# Patient Record
Sex: MALE | Race: WHITE
[De-identification: names, ages, dates, MRNs, and addresses within clinical notes are randomized per-mention and may not be internally consistent; named-entity substitution may affect disease eponyms.]

---

## 2020-09-17 ENCOUNTER — HOSPITAL ENCOUNTER (OUTPATIENT)
Dept: HOSPITAL 47 - RADUSWWP | Age: 70
Discharge: HOME | End: 2020-09-17
Attending: DERMATOLOGY
Payer: MEDICARE

## 2020-09-17 DIAGNOSIS — L30.9: Primary | ICD-10-CM

## 2020-09-17 NOTE — US
EXAMINATION TYPE: US venous doppler duplex LE RT

 

DATE OF EXAM: 9/17/2020 9:15 AM

 

COMPARISON: NONE

 

CLINICAL HISTORY: L30.9 Dermatitis unspecified. swelling in right lower leg, no h/o dvt

 

SIDE PERFORMED: Right  

 

TECHNIQUE:  The lower extremity deep venous system is examined utilizing real time linear array sonog
kelley with graded compression, doppler sonography and color-flow sonography.

 

VESSELS IMAGED:

External Iliac Vein (EIV)

Common Femoral Vein

Deep Femoral Vein

Greater Saphenous Vein *

Femoral Vein

Popliteal Vein

Small Saphenous Vein *

Proximal Calf Veins

(* superficial vessels)

 

 

 

Right Leg:  Negative for DVT

 

 

 

IMPRESSION: No evidence of DVT.

## 2022-06-05 ENCOUNTER — HOSPITAL ENCOUNTER (INPATIENT)
Dept: HOSPITAL 47 - EC | Age: 72
LOS: 2 days | Discharge: HOME | DRG: 281 | End: 2022-06-07
Attending: HOSPITALIST | Admitting: HOSPITALIST
Payer: MEDICARE

## 2022-06-05 DIAGNOSIS — Z80.52: ICD-10-CM

## 2022-06-05 DIAGNOSIS — I45.10: ICD-10-CM

## 2022-06-05 DIAGNOSIS — E78.5: ICD-10-CM

## 2022-06-05 DIAGNOSIS — Z87.820: ICD-10-CM

## 2022-06-05 DIAGNOSIS — I42.8: ICD-10-CM

## 2022-06-05 DIAGNOSIS — I21.4: Primary | ICD-10-CM

## 2022-06-05 DIAGNOSIS — Z79.899: ICD-10-CM

## 2022-06-05 DIAGNOSIS — Z88.0: ICD-10-CM

## 2022-06-05 DIAGNOSIS — Z82.49: ICD-10-CM

## 2022-06-05 DIAGNOSIS — Z91.012: ICD-10-CM

## 2022-06-05 DIAGNOSIS — E66.9: ICD-10-CM

## 2022-06-05 DIAGNOSIS — R51.9: ICD-10-CM

## 2022-06-05 DIAGNOSIS — I25.110: ICD-10-CM

## 2022-06-05 DIAGNOSIS — I10: ICD-10-CM

## 2022-06-05 DIAGNOSIS — F41.9: ICD-10-CM

## 2022-06-05 DIAGNOSIS — E11.9: ICD-10-CM

## 2022-06-05 LAB
ALBUMIN SERPL-MCNC: 4 G/DL (ref 3.5–5)
ALP SERPL-CCNC: 35 U/L (ref 38–126)
ALT SERPL-CCNC: 19 U/L (ref 4–49)
ANION GAP SERPL CALC-SCNC: 9 MMOL/L
APTT BLD: 24.2 SEC (ref 22–30)
AST SERPL-CCNC: 25 U/L (ref 17–59)
BASOPHILS # BLD AUTO: 0.1 K/UL (ref 0–0.2)
BASOPHILS NFR BLD AUTO: 1 %
BUN SERPL-SCNC: 11 MG/DL (ref 9–20)
CALCIUM SPEC-MCNC: 8.9 MG/DL (ref 8.4–10.2)
CHLORIDE SERPL-SCNC: 106 MMOL/L (ref 98–107)
CO2 SERPL-SCNC: 25 MMOL/L (ref 22–30)
EOSINOPHIL # BLD AUTO: 0.6 K/UL (ref 0–0.7)
EOSINOPHIL NFR BLD AUTO: 7 %
ERYTHROCYTE [DISTWIDTH] IN BLOOD BY AUTOMATED COUNT: 4.72 M/UL (ref 4.3–5.9)
ERYTHROCYTE [DISTWIDTH] IN BLOOD: 14.3 % (ref 11.5–15.5)
GLUCOSE SERPL-MCNC: 202 MG/DL (ref 74–99)
HCT VFR BLD AUTO: 43.2 % (ref 39–53)
HGB BLD-MCNC: 14.6 GM/DL (ref 13–17.5)
INR PPP: 0.9 (ref ?–1.2)
LIPASE SERPL-CCNC: 112 U/L (ref 23–300)
LYMPHOCYTES # SPEC AUTO: 3.8 K/UL (ref 1–4.8)
LYMPHOCYTES NFR SPEC AUTO: 41 %
MAGNESIUM SPEC-SCNC: 2 MG/DL (ref 1.6–2.3)
MCH RBC QN AUTO: 30.9 PG (ref 25–35)
MCHC RBC AUTO-ENTMCNC: 33.8 G/DL (ref 31–37)
MCV RBC AUTO: 91.5 FL (ref 80–100)
MONOCYTES # BLD AUTO: 0.5 K/UL (ref 0–1)
MONOCYTES NFR BLD AUTO: 6 %
NEUTROPHILS # BLD AUTO: 3.9 K/UL (ref 1.3–7.7)
NEUTROPHILS NFR BLD AUTO: 43 %
PLATELET # BLD AUTO: 190 K/UL (ref 150–450)
POTASSIUM SERPL-SCNC: 4.1 MMOL/L (ref 3.5–5.1)
PROT SERPL-MCNC: 6.5 G/DL (ref 6.3–8.2)
PT BLD: 10.2 SEC (ref 9–12)
SODIUM SERPL-SCNC: 140 MMOL/L (ref 137–145)
WBC # BLD AUTO: 9.1 K/UL (ref 3.8–10.6)

## 2022-06-05 PROCEDURE — 99291 CRITICAL CARE FIRST HOUR: CPT

## 2022-06-05 PROCEDURE — 80053 COMPREHEN METABOLIC PANEL: CPT

## 2022-06-05 PROCEDURE — 93005 ELECTROCARDIOGRAM TRACING: CPT

## 2022-06-05 PROCEDURE — 85610 PROTHROMBIN TIME: CPT

## 2022-06-05 PROCEDURE — 80061 LIPID PANEL: CPT

## 2022-06-05 PROCEDURE — 85730 THROMBOPLASTIN TIME PARTIAL: CPT

## 2022-06-05 PROCEDURE — 83690 ASSAY OF LIPASE: CPT

## 2022-06-05 PROCEDURE — 80048 BASIC METABOLIC PNL TOTAL CA: CPT

## 2022-06-05 PROCEDURE — 36415 COLL VENOUS BLD VENIPUNCTURE: CPT

## 2022-06-05 PROCEDURE — 83735 ASSAY OF MAGNESIUM: CPT

## 2022-06-05 PROCEDURE — 93308 TTE F-UP OR LMTD: CPT

## 2022-06-05 PROCEDURE — 71046 X-RAY EXAM CHEST 2 VIEWS: CPT

## 2022-06-05 PROCEDURE — 83880 ASSAY OF NATRIURETIC PEPTIDE: CPT

## 2022-06-05 PROCEDURE — 96374 THER/PROPH/DIAG INJ IV PUSH: CPT

## 2022-06-05 PROCEDURE — 84484 ASSAY OF TROPONIN QUANT: CPT

## 2022-06-05 PROCEDURE — 85025 COMPLETE CBC W/AUTO DIFF WBC: CPT

## 2022-06-05 PROCEDURE — 93458 L HRT ARTERY/VENTRICLE ANGIO: CPT

## 2022-06-05 NOTE — ED
Chest Pain HPI





- General


Chief Complaint: Chest Pain


Stated Complaint: Chest Pain


Time Seen by Provider: 06/05/22 21:48


Source: patient, family, RN notes reviewed


Mode of arrival: ambulatory


Limitations: no limitations





- History of Present Illness


Initial Comments: 





71-year-old male with a history of hypertension a closed head injury who is 

asked she scheduled for a cardiac catheterization in 4 days who presents tonight

with complaints of chest heaviness which started about one hour ago associated 

with shortness of breath.  It was 10/10 severity he took nitroglycerin at home 

with improvement to 4/10 no fevers chills sweats no other current complaints or 

modifying factors


MD Complaint: chest pain





- Related Data


                                    Allergies











Allergy/AdvReac Type Severity Reaction Status Date / Time


 


egg Allergy  Nausea & Verified 06/05/22 21:47





   Vomiting &  





   Diarrhea  


 


Penicillins Allergy  Rash/Hives Verified 06/05/22 21:47














Review of Systems


ROS Statement: 


Those systems with pertinent positive or pertinent negative responses have been 

documented in the HPI.





ROS Other: All systems not noted in ROS Statement are negative.





EKG Findings





- EKG Results:


EKG: interpreted by CLARISSE, sinus rhythm (Sinus rhythm a 55968 QRS duration 150 QT

since /452 that exodeviation right bundle-branch block pattern moderate 

T-wave abnormality)





Past Medical History


Past Medical History: Hypertension


Additional Past Medical History / Comment(s): head injury


History of Any Multi-Drug Resistant Organisms: None Reported


Past Surgical History: Heart Catheterization


Past Psychological History: Anxiety


Smoking Status: Never smoker


Past Alcohol Use History: None Reported


Past Drug Use History: None Reported





General Exam





- General Exam Comments


Initial Comments: 





This is a well-developed well-nourished awake alert oriented times 4 male


Limitations: no limitations


General appearance: alert, anxious


Head exam: Present: atraumatic, normocephalic, normal inspection


Eye exam: Present: normal appearance, PERRL, EOMI.  Absent: scleral icterus, 

conjunctival injection, periorbital swelling


ENT exam: Present: normal exam, mucous membranes moist


Neck exam: Present: normal inspection, full ROM, other.  Absent: tenderness, 

meningismus, lymphadenopathy


Respiratory exam: Present: normal lung sounds bilaterally.  Absent: respiratory 

distress, wheezes, rales, rhonchi, stridor


Cardiovascular Exam: Present: regular rate, normal rhythm, normal heart sounds. 

Absent: systolic murmur, diastolic murmur, rubs, gallop, clicks


GI/Abdominal exam: Present: soft, normal bowel sounds.  Absent: distended, 

tenderness, guarding, rebound, rigid


Extremities exam: Present: normal inspection, full ROM, normal capillary refill.

 Absent: tenderness, pedal edema, joint swelling, calf tenderness


Back exam: Present: normal inspection


Neurological exam: Present: alert, oriented X3, CN II-XII intact


Psychiatric exam: Present: normal affect, normal mood


Skin exam: Present: warm, dry, intact, normal color.  Absent: rash





Course


                                   Vital Signs











  06/05/22 06/05/22 06/05/22





  21:40 22:00 22:29


 


Temperature 97.5 F L  


 


Pulse Rate 62  87


 


Respiratory 18 20 20





Rate   


 


Blood Pressure 144/70  153/91


 


O2 Sat by Pulse 96  97





Oximetry   














  06/05/22





  22:46


 


Temperature 


 


Pulse Rate 65


 


Respiratory 20





Rate 


 


Blood Pressure 142/80


 


O2 Sat by Pulse 99





Oximetry 














- Reevaluation(s)


Reevaluation #1: 





06/05/22 23:23


Reevaluation patient finds that he is pain-free after treatment rendered.





Chest Pain MDM





- MDM





Imaging reviewed no acute findings.  I did discuss findings with patient family 

members as well as with Dr. Medrano patient be admitted for inpatient evaluation

and treatment consultation by Dr. Mercedes.





Critical Care Time


Critical Care Time: Yes


Total Critical Care Time: 31


Critical Care Time: 





Critical care time includes initial presentation with history physical labs x-

rays reevaluation patient responsive therapy review of old charting was 

available.  Discussed with patient family regarding findings discussion with the

admitting physician admission orders and documentation of the above





Disposition


Clinical Impression: 


 Non-STEMI (non-ST elevated myocardial infarction), Chest pain





Disposition: ADMITTED AS IP TO THIS [facility]


Condition: Stable


Referrals: 


Gold Bolden MD [Primary Care Provider] - 1-2 days


Decision Date: 06/05/22


Decision Time: 23:24

## 2022-06-05 NOTE — XR
EXAMINATION TYPE: XR chest 2V

 

DATE OF EXAM: 6/5/2022

 

COMPARISON: NONE

 

HISTORY: Chest pain

 

TECHNIQUE: 2 views

 

FINDINGS: Heart is normal. Lungs are clear of infiltrate. Diaphragm is normal. Bony thorax is intact.
 There are chest leads

 

IMPRESSION: Normal chest.

## 2022-06-06 LAB
CHOLEST SERPL-MCNC: 138 MG/DL (ref 0–200)
HDLC SERPL-MCNC: 21.1 MG/DL (ref 40–60)
LDLC SERPL CALC-MCNC: 68.3 MG/DL (ref 0–131)
TRIGL SERPL-MCNC: 243 MG/DL (ref 0–149)
VLDLC SERPL CALC-MCNC: 48.6 MG/DL (ref 5–40)

## 2022-06-06 PROCEDURE — B2111ZZ FLUOROSCOPY OF MULTIPLE CORONARY ARTERIES USING LOW OSMOLAR CONTRAST: ICD-10-PCS

## 2022-06-06 PROCEDURE — 4A023N7 MEASUREMENT OF CARDIAC SAMPLING AND PRESSURE, LEFT HEART, PERCUTANEOUS APPROACH: ICD-10-PCS

## 2022-06-06 RX ADMIN — METOPROLOL SUCCINATE SCH MG: 50 TABLET, EXTENDED RELEASE ORAL at 09:25

## 2022-06-06 RX ADMIN — ACETAMINOPHEN PRN MG: 325 TABLET, FILM COATED ORAL at 03:20

## 2022-06-06 RX ADMIN — ACETAMINOPHEN PRN MG: 325 TABLET, FILM COATED ORAL at 15:41

## 2022-06-06 RX ADMIN — ISOSORBIDE MONONITRATE SCH MG: 30 TABLET, EXTENDED RELEASE ORAL at 12:27

## 2022-06-06 RX ADMIN — SPIRONOLACTONE SCH MG: 25 TABLET, FILM COATED ORAL at 12:27

## 2022-06-06 RX ADMIN — SACUBITRIL AND VALSARTAN SCH EACH: 24; 26 TABLET, FILM COATED ORAL at 20:12

## 2022-06-06 RX ADMIN — CEFAZOLIN SCH MLS/HR: 330 INJECTION, POWDER, FOR SOLUTION INTRAMUSCULAR; INTRAVENOUS at 12:26

## 2022-06-06 NOTE — CA
Transthoracic Echo Report 

 Name: Abhay Romero 

 MRN:    [id number] 

 Age:    71     Gender:     M 

 

 :    1950 

 Exam Date:     2022 09:16 

 Exam Location: [facility] 

 Ht (in):     64     Wt (lb):     200 

 Ordering Physician:        Yani Carrillo 

 Attending/Referring Phys: 

 Technician         Christa Riojas RDCS 

 Procedure CPT: 

 Indications:       elevated troponin, chest pain 

 

 Cardiac Hx: 

 Technical Quality:      Good 

 Contrast 1:                                Total Dose (mL): 

 Contrast 2:                                Total Dose (mL): 

 

 MEASUREMENTS  (Male / Female) Normal Values 

 2D ECHO 

 LV Diastolic Diameter PLAX        3.1 cm                4.2 - 5.9 / 3.9 - 5.3 cm 

 LV Systolic Diameter PLAX         1.6 cm                 

 IVS Diastolic Thickness           2.4 cm                0.6 - 1.0 / 0.6 - 0.9 cm 

 LVPW Diastolic Thickness          2.1 cm                0.6 - 1.0 / 0.6 - 0.9 cm 

 LV Relative Wall Thickness        1.5                    

 

 

 FINDINGS 

 Left Ventricle 

 Severely increased left ventricular wall thickness. Decreased cavity due to  

 thickness. Left ventricular ejection fraction is estimated at 40-45%. 

 

 Right Ventricle 

 

 

 Right Atrium 

 

 

 Left Atrium 

 

 

 Mitral Valve 

 

 

 Aortic Valve 

 

 

 Tricuspid Valve 

 

 

 Pulmonic Valve 

 

 

 Pericardium 

 No pericardial effusion. 

 

 Aorta 

 

 

 CONCLUSIONS 

 #1.  Limited study #2.  Severe concentric left ankle hypertrophy #3.  Small  

 left ankle cavity before.  #4.  Left ankle function overall appears to be  

 diminished with an ejection fraction about 40-45% 

 Previewed by:  

 Dr. Jenelle Mercedes MD 

 (Electronically Signed) 

 Final Date:      2022 11:44

## 2022-06-06 NOTE — P.CARDCATH
Date of Procedure: 06/06/22


Preoperative Diagnosis: 


Unstable angina and shortness of breath


Postoperative Diagnosis: 


Moderate plaque in the diagonal at the ostium.  The rest of the presence of free

of any significant focal lesions


Procedure(s) Performed: 


Left heart catheterization without left ventriculography


Description of Procedure: 


HISTORY: This is a 71-year-old gentleman with history of hypertension, 

hypertrophic cardiomyopathy who has been experiencing exertional shortness of 

breath.  Stress test was suggestive of possible ischemia of the inferior wall 

with generalized hypokinesia and an ejection fraction of 30%.  Patient was 

initiated on nitrates and advised to have cardiac catheterization.  





CONSENT:I have discussed the risks, benefits and alternative therapies for the 

above-mentioned procedure and for both sedation/analgesia as well as necessary 

blood product administration, if indicated, as they pertain to this patient.  

The patient has indicated understanding and acceptance of the risks and 

procedures discussed.











PROCEDURE: Patient was brought to the lab in a fasting state.  Patient was given

some IV sedation.  The right wrist is infiltrated with lidocaine and right 

radial artery was entered using Seldinger technique.  A 6-Nigerian catheter was 

left in place and selective coronary arteriography  was performed.  Patient 

tolerated the procedure well.  TR band was applied  was applied for hemostasis. 

No immediate complications were noted and patient was transferred to [facility] in a 

stable condition





Conscious Sedation: Versed 1mg


Fentanyl 25 micrograms.  Patient also received 4000 units of heparin


Duration 19minutes   


HEMODYNAMICS: .  The aortic pressure is about 130/70.  The left ventricle end-

diastolic pressure is about 25-30.  There was no gradient across the aortic 

valve





SELECTIVE CORONARY ARTERIOGRAPHY: []


                          LEFT MAIN: Normal length and free of occlusive disease


                          THE LEFT ANTERIOR DESCENDING CORONARY ARTERY: .  Good 

caliber vessel which wraps around the apex.  Gives rise to good-sized second 

diagonal branch which has about 60% ostial lesion


                          THE LEFT CIRCUMFLEX AND IS CORONARY ARTERY: .  This is

 a moderate caliber vessel.  There is also good-sized intermediate/OM branch.  

Circumflex and branches are free of occlusive disease


                          THE RIGHT CORONARY ARTERY: .  This is a large and 

dominant vessel.  Free of occlusive disease





      





LEFT VENTRICULOGRAPHY: .  Performed





FINAL IMPRESSION: Moderate plaque in the diagonal.  Otherwise, no significant 

disease.  Elevated end-diastolic pressure with impaired LV function on the 

echocardiogram





PLAN: Maximum medical therapy and this factor modification.  We'll also start 

the patient on Entresto , Diuretics including Lasix and Aldactone





PROGNOSIS: Guarded

## 2022-06-06 NOTE — P.CRDCN
History of Present Illness


History of present illness: 


HISTORY OF PRESENTING ILLNESS


This is a pleasant 71 year-old male past medical history significant for 

hypertension, type 2 diabetes, and closed head injury. He follows in the office 

with Dr. Mercedes. We have been asked to see in consultation for chest pain. 

Patient presents to the ER with complaints of worsening chest heaviness and 

shortness of breath. His chest discomfort and shortness of breath is aggravated 

by exertion and activity. Non-radiating. No specific alleviating factors, Nitro 

did not help. It has progressively gotten worse over the past 2 weeks. No 

associated diaphoresis, nausea, vomiting, lightheadedness, dizziness or syncope.

He denies symptoms of orthopnea or PND. He does not have a history of MI, CAD, 

or Stroke. He denies tobacco use, quit 40+ years ago. Patient recently was 

evaluated in the office, he underwent Lexiscan stress test which was abnormal. 

Plan for cardiac catheterization on 6/9/2022 was scheduled. 





DIAGNOSTICS


EKG reveals sinus rhythm, heart rate 60, right bundle-branch block, left axis 

deviation, T-wave inversions in anterior and lateral leads.  Prior EKG in the 

office 03/2022 was similar T wave abnormalities.


Recent Lexiscan in the office 04/14/2022= revealed abnormal perfusion study with

reversible ischemia involving the inferolateral segment. Generalized hypokinesia

more pronounced on the septal area with an EF of about 30% 


Recent Echo in the office 05/20/22- EF 70%, LV hyperdynamic, mild AR, Mild MR, 

moderate TR.  


Last Cardiac Catheterization 2016 revealed no significant coronary artery 

disease. Questionable plaque in the bifurtcation of the LAD and diagonal branch 


Telemetry tracings indicate sinus mechanism


Chest xray no acute cardiopulmonary process


Laboratory reviewed, troponin 0.062, CBC unremarkable, sodium 140, potassium 

4.1, BUN 11, serum creatinine 0.9, magnesium 2.0, proBNP is 832


Current home medications include rosuvastatin 5 mg daily, metoprolol succinate 

75 mg daily, Imdur 30 mg daily, low fibra 160 mg daily





REVIEW OF SYSTEMS


At the time of my exam:


CONSTITUTIONAL: Denies fever or chills.


CARDIOVASCULAR: Denies chest pain, shortness of breath, orthopnea, PND or 

palpitations.


RESPIRATORY: Denies cough. 


GASTROINTESTINAL: Denies abdominal pain, diarrhea, constipation, nausea or 

vomiting.


MUSCULOSKELETAL: Denies myalgias.


NEUROLOGIC: Denies numbness, tingling, headache or weakness.


ENDOCRINE: Denies fatigue, weight change,  polydipsia or polyurina.


GENITOURINARY: Denies burning, hematuria or urgency with micturation.


HEMATOLOGIC: Denies history of anemia or bleeding. 





PHYSICAL EXAMINATION


Blood pressure 147/64, heart rate 60, afebrile, saturation 99% on 3 L nasal 

cannula


CONSTITUTIONAL: No apparent distress. 


HEENT: Head is normocephalic. Pupils are equal, round. Sclerae anicteric. Mucous

membranes of the mouth are moist.  No JVD. No carotid bruit.


CHEST EXAMINATION: Lungs are clear to auscultation. No chest wall tenderness is 

noted on palpation or with deep breathing. 


HEART EXAMINATION: Regular rate and rhythm. S1, S2 heard. No murmurs, gallops or

rub.


ABDOMEN: Soft, nontender. Positive bowel sounds.


EXTREMITIES: 2+ peripheral pulses, no lower extremity edema and no calf ten

derness.


SKIN: warm, dry 


NEUROLOGIC EXAMINATION: Patient is awake, alert and oriented x3. 





ASSESSMENT


NSTEMI


Hypertension


Type 2 diabetes


History of closed head injury





PLAN


We will plan for cardiac catheterization today with Dr. Mercedes 


I have discussed the risks, benefits and alternative therapies for the above-

mentioned procedure and for both sedation/analgesia as well as necessary blood 

product administration, if indicated, as they pertain to this patient.  The 

patient has indicated understanding and acceptance of the risks and procedures 

discussed.  Questions have been answered appropriately and he is agreeable to 

move forward with the above-stated procedure. 


Obtain 2D echocardiogram and doppler study to assess cardiac structure and 

function. 


Continue home aspirin, statin, metoprolol. 


Further recommendations based on clinical course





Thank you kindly for this consultation. 


Nurse practitioner note has been reviewed by physician. Signing provider agrees 

with the documented findings, assessment, and plan of care. 











Past Medical History


Past Medical History: Diabetes Mellitus, Hypertension


Additional Past Medical History / Comment(s): head injury. DM controlled with 

diet.


History of Any Multi-Drug Resistant Organisms: None Reported


Past Surgical History: Heart Catheterization


Past Psychological History: Anxiety


Smoking Status: Never smoker


Past Alcohol Use History: None Reported


Past Drug Use History: None Reported





- Past Family History


  ** Father


Family Medical History: Cancer, Myocardial Infarction (MI)


Additional Family Medical History / Comment(s): bladder CA





Medications and Allergies


                                Home Medications











 Medication  Instructions  Recorded  Confirmed  Type


 


Fenofibrate [Lofibra] 160 mg PO DAILY 06/06/22 06/06/22 History


 


Gabapentin 300 mg PO TID 06/06/22 06/06/22 History


 


Isosorbide Mononitrate ER [Imdur] 30 mg PO DAILY 06/06/22 06/06/22 History


 


LORazepam [Ativan] 0.5 - 1 mg PO DAILY PRN 06/06/22 06/06/22 History


 


Metoprolol Succinate (ER) [Toprol 50 mg PO DAILY 06/06/22 06/06/22 History





Xl]    


 


Metoprolol Succinate [Toprol XL] 25 mg PO DAILY 06/06/22 06/06/22 History


 


Nitroglycerin Sl Tabs [Nitrostat] 0.4 mg SUBLINGUAL Q5M PRN 06/06/22 06/06/22 

History


 


Rosuvastatin Calcium [Crestor] 5 mg PO HS 06/06/22 06/06/22 History








                                    Allergies











Allergy/AdvReac Type Severity Reaction Status Date / Time


 


egg Allergy  Nausea & Verified 06/06/22 08:44





   Vomiting &  





   Diarrhea  


 


Penicillins Allergy  Rash/Hives Verified 06/06/22 08:44


 


buspirone [From BuSpar] AdvReac  HEADACHE Verified 06/06/22 08:44














Physical Exam


Vitals: 


                                   Vital Signs











  Temp Pulse Pulse Resp BP BP Pulse Ox


 


 06/06/22 03:47  97.8 F   56 L  17   147/64  99


 


 06/06/22 00:25  97.8 F   60  18   148/71  98


 


 06/05/22 22:46   65   20  142/80   99


 


 06/05/22 22:29   87   20  153/91   97


 


 06/05/22 22:00     20   


 


 06/05/22 21:40  97.5 F L  62   18  144/70   96








                                Intake and Output











 06/05/22 06/06/22 06/06/22





 22:59 06:59 14:59


 


Other:   


 


  Voiding Method  Toilet 


 


  Weight 90.718 kg 90.718 kg 














Results





                                 06/05/22 22:04





                                 06/05/22 22:04


                                 Cardiac Enzymes











  06/05/22 06/05/22 06/06/22 Range/Units





  22:04 22:04 01:02 


 


AST  25    (17-59)  U/L


 


Troponin I   0.063 H*  0.061 H*  (0.000-0.034)  ng/mL








                                   Coagulation











  06/05/22 Range/Units





  22:04 


 


PT  10.2  (9.0-12.0)  sec


 


APTT  24.2  (22.0-30.0)  sec








                                       CBC











  06/05/22 Range/Units





  22:04 


 


WBC  9.1  (3.8-10.6)  k/uL


 


RBC  4.72  (4.30-5.90)  m/uL


 


Hgb  14.6  (13.0-17.5)  gm/dL


 


Hct  43.2  (39.0-53.0)  %


 


Plt Count  190  (150-450)  k/uL








                          Comprehensive Metabolic Panel











  06/05/22 Range/Units





  22:04 


 


Sodium  140  (137-145)  mmol/L


 


Potassium  4.1  (3.5-5.1)  mmol/L


 


Chloride  106  ()  mmol/L


 


Carbon Dioxide  25  (22-30)  mmol/L


 


BUN  11  (9-20)  mg/dL


 


Creatinine  0.96  (0.66-1.25)  mg/dL


 


Glucose  202 H  (74-99)  mg/dL


 


Calcium  8.9  (8.4-10.2)  mg/dL


 


AST  25  (17-59)  U/L


 


ALT  19  (4-49)  U/L


 


Alkaline Phosphatase  35 L  ()  U/L


 


Total Protein  6.5  (6.3-8.2)  g/dL


 


Albumin  4.0  (3.5-5.0)  g/dL








                               Current Medications











Generic Name Dose Route Start Last Admin





  Trade Name Freq  PRN Reason Stop Dose Admin


 


Acetaminophen  650 mg  06/06/22 03:09  06/06/22 03:20





  Acetaminophen Tab 325 Mg Tab  PO   650 mg





  Q6HR PRN   Administration





  Fever and/ or Pain  


 


Aspirin  325 mg  06/06/22 09:00 





  Aspirin 325 Mg Tab  PO  





  DAILY JANETH  


 


Heparin Sodium/Sodium Chloride  250 mls @ 10 mls/hr  06/05/22 22:00  06/05/22 

22:24





  25,000 unit/ Sodium Chloride  IV   11.0231 units/kg/hr





  .Q24H JANETH   10 mls/hr





    Administration





  Protocol  





  11.0231 UNITS/KG/HR  


 


Nitroglycerin  0.4 mg  06/05/22 23:25 





  Nitroglycerin Sl Tabs 0.4 Mg Tab  SUBLINGUAL  





  Q5M PRN  





  Chest Pain  








                                Intake and Output











 06/05/22 06/06/22 06/06/22





 22:59 06:59 14:59


 


Other:   


 


  Voiding Method  Toilet 


 


  Weight 90.718 kg 90.718 kg 








                                        





                                 06/05/22 22:04 





                                 06/05/22 22:04

## 2022-06-06 NOTE — P.HPIM
History of Present Illness


H&P Date: 06/06/22


Chief Complaint: Shortness of breath





This is a pleasant 71-year-old patient who follows with Gold Bolden.  

Cardiologist Dr. Ruiz.  Chronic stable medical conditions include 

diabetes, hypertension, hyperlipidemia, anxiety.  Patient been getting short of 

breath on and off for some time.  Especially with activity.  Some chest 

discomfort..  No edema.  No fever no chills.  He had a scheduled cardiac 

catheterization on June 9 following abnormal Lexiscan nuclear stress test.  No 

dizziness or lightheadedness.  No perspiration.


As symptoms have progressively gotten worse especially yesterday patient decided

to come in.  Had some mild posterior troponin.  Early this morning patient 

underwent a cardiac catheterization.  Was found of a plaque dose at 11 disease 

otherwise.  Medical management was decided.





Review of systems:


GEN.:  Tired


EYES: None


HEENT: None


NECK: None


RESPIRATORY: As above


CARDIOVASCULAR: As above


GASTROINTESTINAL: None


GENITOURINARY: None


MUSCULOSKELETAL: None


LYMPHATICS: None


HEMATOLOGICAL: None  


PSYCHIATRY: None


NEUROLOGICAL: None





Past medical history to include:


Diabetes, hypertension, hyperlipidemia, anxiety





Social history:


No smoking or alcohol.  .  Used to be a [employer]





Family history:


Bladder cancer.  MI.





Physical examination:


VITAL SIGNS: 97.5, 62, 18, 140/70, and 6% room air upon presentation


GENERAL: BMI 34.3, reclining in bed awake comfortable.


EYES: Pupils equal.  Conjunctiva normal.


HEENT: External appearance of nose and ears normal, oral cavity grossly normal.


NECK: JVD not raised; masses not palpable.


HEART: First and second heart sounds are normal;  no edema.  


LUNGS: Respiratory rate normal; clear to auscultation.  


ABDOMEN: Soft,  nontender, liver spleen not palpable, no masses palpable.  


PSYCH: Alert and oriented x3;  mood  and affect normal.  


MUSCULOSKELETAL:No Clubbing/cyanosis;muscles-grossly intact


NEUROLOGICAL: Cranial nerves grossly intact; no facial asymmetry,   power and 

sensation grossly intact. 


LYMPHATICS: No lymph nodes palpable in the axilla and neck





INVESTIGATIONS, reviewed in the clinical context:


White count 9.1 hemoglobin 14.6 platelets 190 potassium 4.1 creatinine 0.96


Troponin I 0.063, 0.061, 0.057


LDL 68 triglycerides 243


EKG tracing personally reviewed by me-sinus rhythm.  T waves in inferolateral 

leads


Chest x-ray film personally reviewed by me-cardiomegaly


Limited 2-D echocardiogram: Severely increased left ventricular wall thickness. 

EF 40-45%.


Cardiac catheterization: Moderate plaque in the diagonal at the ostium.  

Otherwise no other significant disease.





Assessment plan:





-Non-ST elevation myocardial infarction


Cardiac cath showing moderateplaque





-Hyperlipidemia


Crestor 5 mg daily at bedtime





-Essential hypertension


Toprol-XL 50 mg a day





-Cardiomyopathy.  EF 40-45%


Entresto 24/26 one tablet twice a day started today.  Aldactone 25 mg daily.  

Toprol-XL.  Lasix 40 mg a day.





Patient status post cardiac cath.  Medications above.  Discussed with the 

patient.  Repeat labs in the morning.





Past Medical History


Past Medical History: Diabetes Mellitus, Hypertension


Additional Past Medical History / Comment(s): head injury. DM controlled with 

diet.


History of Any Multi-Drug Resistant Organisms: None Reported


Past Surgical History: Heart Catheterization


Past Psychological History: Anxiety


Smoking Status: Never smoker


Past Alcohol Use History: None Reported


Past Drug Use History: None Reported





- Past Family History


  ** Father


Family Medical History: Cancer, Myocardial Infarction (MI)


Additional Family Medical History / Comment(s): bladder CA





Medications and Allergies


                                Home Medications











 Medication  Instructions  Recorded  Confirmed  Type


 


Fenofibrate [Lofibra] 160 mg PO DAILY 06/06/22 06/06/22 History


 


Gabapentin 300 mg PO TID 06/06/22 06/06/22 History


 


Isosorbide Mononitrate ER [Imdur] 30 mg PO DAILY 06/06/22 06/06/22 History


 


LORazepam [Ativan] 0.5 - 1 mg PO DAILY PRN 06/06/22 06/06/22 History


 


Metoprolol Succinate (ER) [Toprol 50 mg PO DAILY 06/06/22 06/06/22 History





Xl]    


 


Metoprolol Succinate [Toprol XL] 25 mg PO DAILY 06/06/22 06/06/22 History


 


Nitroglycerin Sl Tabs [Nitrostat] 0.4 mg SUBLINGUAL Q5M PRN 06/06/22 06/06/22 

History


 


Rosuvastatin Calcium [Crestor] 5 mg PO HS 06/06/22 06/06/22 History








                                    Allergies











Allergy/AdvReac Type Severity Reaction Status Date / Time


 


egg Allergy  Nausea & Verified 06/06/22 08:44





   Vomiting &  





   Diarrhea  


 


Penicillins Allergy  Rash/Hives Verified 06/06/22 08:44


 


buspirone [From BuSpar] AdvReac  HEADACHE Verified 06/06/22 08:44














Physical Exam


Vitals: 


                                   Vital Signs











  Temp Pulse Pulse Resp BP BP Pulse Ox


 


 06/06/22 09:22  98.1 F   57 L  18   180/78  98


 


 06/06/22 03:47  97.8 F   56 L  17   147/64  99


 


 06/06/22 00:25  97.8 F   60  18   148/71  98


 


 06/05/22 22:46   65   20  142/80   99


 


 06/05/22 22:29   87   20  153/91   97


 


 06/05/22 22:00     20   


 


 06/05/22 21:40  97.5 F L  62   18  144/70   96








                                Intake and Output











 06/05/22 06/06/22 06/06/22





 22:59 06:59 14:59


 


Intake Total   1107.333


 


Balance   1107.333


 


Intake:   


 


  IV   100


 


  Intake, IV Titration   1007.333





  Amount   


 


    Heparin Sod,Pork in 0.45%   107.333





    NaCl 25,000 unit In 0.45   





    % NaCl 1 250ml.bag @ 11.   





    0231 UNITS/KG/HR 10 mls/   





    hr IV .Q24H [facility] Rx#:   





    [id number]   


 


    Sodium Chloride 0.9% 1,   900





    000 ml In Empty Bag 1 bag   





    @ 1 ML/KG/HR 90.718 mls/   





    hr IV .Q11H2M [facility] Rx#:   





    [id number]   


 


Other:   


 


  Voiding Method  Toilet Toilet


 


  Weight 90.718 kg 90.718 kg 














Results


CBC & Chem 7: 


                                 06/05/22 22:04





                                 06/05/22 22:04


Labs: 


                  Abnormal Lab Results - Last 24 Hours (Table)











  06/05/22 06/05/22 06/06/22 Range/Units





  22:04 22:04 01:02 


 


APTT     (22.0-30.0)  sec


 


Glucose  202 H    (74-99)  mg/dL


 


Alkaline Phosphatase  35 L    ()  U/L


 


Troponin I   0.063 H*  0.061 H*  (0.000-0.034)  ng/mL














  06/06/22 06/06/22 Range/Units





  07:20 07:20 


 


APTT   42.7 H  (22.0-30.0)  sec


 


Glucose    (74-99)  mg/dL


 


Alkaline Phosphatase    ()  U/L


 


Troponin I  0.057 H*   (0.000-0.034)  ng/mL














Thrombosis Risk Factor Assmnt





- Choose All That Apply


Each Factor Represents 1 point: Obesity (BMI >25)


Each Risk Factor Represents 2 Points: Age 61-74 years


Other congenital or acquired thrombophilia - If yes, enter type in comment: No


Thrombosis Risk Factor Assessment Total Risk Factor Score: 3


Thrombosis Risk Factor Assessment Level: Moderate Risk

## 2022-06-07 VITALS — SYSTOLIC BLOOD PRESSURE: 140 MMHG | TEMPERATURE: 97.4 F | DIASTOLIC BLOOD PRESSURE: 60 MMHG | HEART RATE: 86 BPM

## 2022-06-07 VITALS — RESPIRATION RATE: 18 BRPM

## 2022-06-07 LAB
ANION GAP SERPL CALC-SCNC: 13 MMOL/L
BUN SERPL-SCNC: 14 MG/DL (ref 9–20)
CALCIUM SPEC-MCNC: 8.9 MG/DL (ref 8.4–10.2)
CHLORIDE SERPL-SCNC: 107 MMOL/L (ref 98–107)
CO2 SERPL-SCNC: 20 MMOL/L (ref 22–30)
GLUCOSE SERPL-MCNC: 186 MG/DL (ref 74–99)
POTASSIUM SERPL-SCNC: 4 MMOL/L (ref 3.5–5.1)
SODIUM SERPL-SCNC: 140 MMOL/L (ref 137–145)

## 2022-06-07 RX ADMIN — SACUBITRIL AND VALSARTAN SCH EACH: 24; 26 TABLET, FILM COATED ORAL at 08:39

## 2022-06-07 RX ADMIN — CEFAZOLIN SCH: 330 INJECTION, POWDER, FOR SOLUTION INTRAMUSCULAR; INTRAVENOUS at 08:38

## 2022-06-07 RX ADMIN — ISOSORBIDE MONONITRATE SCH MG: 30 TABLET, EXTENDED RELEASE ORAL at 08:39

## 2022-06-07 RX ADMIN — METOPROLOL SUCCINATE SCH MG: 50 TABLET, EXTENDED RELEASE ORAL at 08:39

## 2022-06-07 RX ADMIN — SPIRONOLACTONE SCH MG: 25 TABLET, FILM COATED ORAL at 08:39

## 2022-06-07 NOTE — P.DS
Providers


Date of admission: 


06/05/22 23:25





Expected date of discharge: 06/07/22


Attending physician: 


Josiah Medrano





Consults: 





                                        





06/05/22 23:25


Consult Physician Urgent 


   Consulting Provider: Jenelle Mercedes


   Consult Reason/Comments: Chest pain, elevated troponin


   Do you want consulting provider notified?: Yes











Primary care physician: 


Gold Bolden MD





Hospital Course: 





Chief Complaint: Shortness of breath





This is a pleasant 71-year-old patient who follows with Gold Bolden.  

Cardiologist Dr. Ruiz.  Chronic stable medical conditions include 

diabetes, hypertension, hyperlipidemia, anxiety.  Patient been getting short of 

breath on and off for some time.  Especially with activity.  Some chest 

discomfort..  No edema.  No fever no chills.  He had a scheduled cardiac 

catheterization on June 9 following abnormal Lexiscan nuclear stress test.  No 

dizziness or lightheadedness.  No perspiration.


As symptoms have progressively gotten worse especially yesterday patient decided

to come in.  Had some mild posterior troponin.  Early this morning patient 

underwent a cardiac catheterization.  Found to have a moderate plaque.  No 

disease otherwise..  Medical management was decided.


June 7: Up and about.  No further cardiac symptoms.  Cleared by cardiology for 

discharge





Past medical history to include:


Diabetes, hypertension, hyperlipidemia, anxiety





Social history:


No smoking or alcohol.  .  Used to be a [employer] 





Family history:


Bladder cancer.  MI.





Physical examination:


VITAL SIGNS: 97.4, 86, 18, 140/60, 98% room air


GENERAL:  comfortable.


EYES: Pupils equal.  Conjunctiva normal.


HEENT: External appearance of nose and ears normal, oral cavity grossly normal.


NECK: JVD not raised; masses not palpable.


HEART: First and second heart sounds are normal;  no edema.  


LUNGS: Respiratory rate normal; clear to auscultation.  


ABDOMEN: Soft,  nontender, liver spleen not palpable, no masses palpable.  


PSYCH: Alert and oriented x3;  mood  and affect normal.  


MUSCULOSKELETAL:No Clubbing/cyanosis;muscles-grossly intact








INVESTIGATIONS, reviewed in the clinical context:


June 7: Potassium 4 creatinine 0.95


White count 9.1 hemoglobin 14.6 platelets 190 potassium 4.1 creatinine 0.96


Troponin I 0.063, 0.061, 0.057


LDL 68 triglycerides 243


EKG tracing personally reviewed by me-sinus rhythm.  T waves in inferolateral 

leads


Chest x-ray film personally reviewed by me-cardiomegaly


Limited 2-D echocardiogram: Severely increased left ventricular wall thickness. 

EF 40-45%.


Cardiac catheterization: Moderate plaque in the diagonal at the ostium.  

Otherwise no other significant disease.





Assessment plan:





-Non-ST elevation myocardial infarction


Cardiac cath showing moderateplaque





-CAD with moderate plaque








-Hyperlipidemia


Crestor 5 mg daily at bedtime





-Essential hypertension


Toprol-XL 50 mg a day





-Cardiomyopathy.  EF 40-45%


Entresto 24/26 one tablet twice a day started today.  Aldactone 25 mg daily.  

Coreg Lasix 40 mg a day.





Disposition:


Home





Plan - Discharge Summary


Discharge Rx Participant: No


New Discharge Prescriptions: 


New


   Aspirin 81 mg PO DAILY 90 Days #90 tab


   Sacubitril/Valsartan [Entresto 24 mg-26 mg Tablet] 1 each PO BID 30 Days #60 

tab


   Furosemide [Lasix] 40 mg PO DAILY 90 Days #90 tab


   Spironolactone [Aldactone] 25 mg PO DAILY 90 Days #90 tab


   carvediloL [Coreg] 6.25 mg PO BID-W/MEALS 30 Days #60 tab


   Rosuvastatin [Crestor] 10 mg PO DAILY 90 Days #90 tablet





Continue


   Fenofibrate [Lofibra] 160 mg PO DAILY


   Nitroglycerin Sl Tabs [Nitrostat] 0.4 mg SUBLINGUAL Q5M PRN


     PRN Reason: Chest Pain


   LORazepam [Ativan] 0.5 - 1 mg PO DAILY PRN


     PRN Reason: Anxiety


   Gabapentin 300 mg PO TID





Discontinued


   Metoprolol Succinate [Toprol XL] 25 mg PO DAILY


   Metoprolol Succinate (ER) [Toprol Xl] 50 mg PO DAILY


   Isosorbide Mononitrate ER [Imdur] 30 mg PO DAILY


   Rosuvastatin Calcium [Crestor] 5 mg PO HS


Discharge Medication List





Fenofibrate [Lofibra] 160 mg PO DAILY 06/06/22 [History]


Gabapentin 300 mg PO TID 06/06/22 [History]


LORazepam [Ativan] 0.5 - 1 mg PO DAILY PRN 06/06/22 [History]


Nitroglycerin Sl Tabs [Nitrostat] 0.4 mg SUBLINGUAL Q5M PRN 06/06/22 [History]


Aspirin 81 mg PO DAILY 90 Days #90 tab 06/07/22 [Rx]


Furosemide [Lasix] 40 mg PO DAILY 90 Days #90 tab 06/07/22 [Rx]


Rosuvastatin [Crestor] 10 mg PO DAILY 90 Days #90 tablet 06/07/22 [Rx]


Sacubitril/Valsartan [Entresto 24 mg-26 mg Tablet] 1 each PO BID 30 Days #60 tab

06/07/22 [Rx]


Spironolactone [Aldactone] 25 mg PO DAILY 90 Days #90 tab 06/07/22 [Rx]


carvediloL [Coreg] 6.25 mg PO BID-W/MEALS 30 Days #60 tab 06/07/22 [Rx]








Follow up Appointment(s)/Referral(s): 


Gold Bolden MD [Primary Care Provider] - 1-2 days (Offices will call with an

appointment date and time)


Jenelle Mercedes MD [STAFF PHYSICIAN] - 06/20/22 3:15 pm


(MONDAY


AT [facility])


Patient Instructions/Handouts:  Chest Pain (DC)


Discharge Disposition: HOME WITH HOME HEALTH SERVICES

## 2022-06-07 NOTE — P.PN
Subjective


This is a pleasant 71 year-old male past medical history significant for 

hypertension, type 2 diabetes, and closed head injury. He follows in the office 

with Dr. Mercedes. We have been asked to see in consultation for chest pain. 

Patient presents to the ER with complaints of worsening chest heaviness and 

shortness of breath. His chest discomfort and shortness of breath is aggravated 

by exertion and activity. Non-radiating. No specific alleviating factors, Nitro 

did not help. It has progressively gotten worse over the past 2 weeks. No associ

ated diaphoresis, nausea, vomiting, lightheadedness, dizziness or syncope. He 

denies symptoms of orthopnea or PND. He does not have a history of MI, CAD, or 

Stroke. He denies tobacco use, quit 40+ years ago. Patient recently was 

evaluated in the office, he underwent Lexiscan stress test which was abnormal. 

Plan for cardiac catheterization on 6/9/2022 was scheduled. 





DIAGNOSTICS


EKG reveals sinus rhythm, heart rate 60, right bundle-branch block, left axis 

deviation, T-wave inversions in anterior and lateral leads.  Prior EKG in the 

office 03/2022 was similar T wave abnormalities.


Recent Lexiscan in the office 04/14/2022= revealed abnormal perfusion study with

reversible ischemia involving the inferolateral segment. Generalized hypokinesia

more pronounced on the septal area with an EF of about 30% 


Recent Echo in the office 05/20/22- EF 70%, LV hyperdynamic, mild AR, Mild MR, 

moderate TR.  


Chest xray no acute cardiopulmonary process


Laboratory reviewed, troponin 0.062, 0.05. 





6/6/2022-


Patient underwent cardiac catheterization with Dr. Mercedes which revealed 

The left ventricle end-diastolic pressure is about 25-30, good-sized second 

diagonal branch which has about 60% ostial lesion. No other significant disease.




Echocardiogram revealed EF 4045%, severe concentric LVH





6/7/2022


Patient seen and examined at bedside, no acute distress.  He denies any further 

chest pain.  Denies shortness of breath. Headache overnight. Vitals signs are 

stable. 


Patient is currently maintained on aspirin 81 mg daily, atorvastatin 10 mg da

joshua, metoprolol succinate 50mg nightly, Lasix 40 mg daily, Entresto 2426mg 

twice a day, spironolactone 25 mg daily





PHYSICAL EXAMINATION


Blood pressure This morning 162/72, repeat 140/60, heart rate 86, afebrile, 

saturations 90% room air


CONSTITUTIONAL: No apparent distress. 


HEENT: Neck supple.   No JVD. 


CHEST EXAMINATION: Lungs are clear to auscultation. No chest wall tenderness is 

noted on palpation or with deep breathing. 


HEART EXAMINATION: Regular rate and rhythm. S1, S2 heard. No murmurs, gallops or

rub.


ABDOMEN: Soft, nontender. Positive bowel sounds.


EXTREMITIES: 2+ peripheral pulses, no lower extremity edema and no calf 

tenderness.


SKIN: Right radial cath site, clean dry intact, no hematoma noted. 


NEUROLOGIC EXAMINATION: Patient is awake, alert and oriented x3. 





ASSESSMENT


Elevated troponin and chest pain, likely secondary to elevated LVEDP and 

hypertension 


Coronary artery disease, Moderate plaque in the diagonal


Non-ischemic cardiomyopathy, EF 40-45%


Elevated LVEDP 


Hypertension


Type 2 diabetes


History of closed head injury





PLAN


Discontinue metoprolol succinate, start carvedilol 6.25mg BID


Continue Entresto BID, spironolactone, Lasix 


Increase rosuvastatin to 10mg daily 


Continue aspirin, lofibra


From cardiology perspective, patient is stable to be discharged.  Follow-up with

Dr. Mercedes in one week








Nurse practitioner note has been reviewed by physician. Signing provider agrees 

with the documented findings, assessment, and plan of care. 








Objective





- Vital Signs


Vital signs: 


                                   Vital Signs











Temp  97.4 F L  06/07/22 11:12


 


Pulse  86   06/07/22 11:12


 


Resp  18   06/07/22 11:12


 


BP  140/60   06/07/22 11:12


 


Pulse Ox  96   06/07/22 08:35


 


FiO2      








                                 Intake & Output











 06/06/22 06/07/22 06/07/22





 18:59 06:59 18:59


 


Intake Total 2007.333  550


 


Balance 2007.333  550


 


Weight  97 kg 


 


Intake:   


 


    10


 


    Invasive Line 1   10


 


  Intake, IV Titration 1007.333  





  Amount   


 


    Heparin Sod,Pork in 0.45% 107.333  





    NaCl 25,000 unit In 0.45   





    % NaCl 1 250ml.bag @ 11.   





    0231 UNITS/KG/HR 10 mls/   





    hr IV .Q24H [facility] Rx#:   





    [id number]   


 


    Sodium Chloride 0.9% 1, 900  





    000 ml In Empty Bag 1 bag   





    @ 1 ML/KG/HR 90.718 mls/   





    hr IV .Q11H2M [facility] Rx#:   





    [id number]   


 


  Oral 900  540


 


Other:   


 


  Voiding Method Toilet Toilet Toilet


 


  # Voids   2














- Labs


CBC & Chem 7: 


                                 06/05/22 22:04





                                 06/07/22 07:51


Labs: 


                  Abnormal Lab Results - Last 24 Hours (Table)











  06/07/22 Range/Units





  07:51 


 


Carbon Dioxide  20 L  (22-30)  mmol/L


 


Glucose  186 H  (74-99)  mg/dL

## 2022-07-19 ENCOUNTER — HOSPITAL ENCOUNTER (OUTPATIENT)
Dept: HOSPITAL 47 - LABWHC1 | Age: 72
Discharge: HOME | End: 2022-07-19
Attending: INTERNAL MEDICINE
Payer: MEDICARE

## 2022-07-19 DIAGNOSIS — I50.9: Primary | ICD-10-CM

## 2022-07-19 LAB
ANION GAP SERPL CALC-SCNC: 11.4 MMOL/L (ref 10–18)
BUN SERPL-SCNC: 12.8 MG/DL (ref 9–27)
BUN/CREAT SERPL: 10.67 RATIO (ref 12–20)
CALCIUM SPEC-MCNC: 9.4 MG/DL (ref 8.7–10.3)
CHLORIDE SERPL-SCNC: 103 MMOL/L (ref 96–109)
CO2 SERPL-SCNC: 25.6 MMOL/L (ref 20–27.5)
GLUCOSE SERPL-MCNC: 234 MG/DL (ref 70–110)
POTASSIUM SERPL-SCNC: 3.9 MMOL/L (ref 3.5–5.5)
SODIUM SERPL-SCNC: 140 MMOL/L (ref 135–145)

## 2022-07-19 PROCEDURE — 80048 BASIC METABOLIC PNL TOTAL CA: CPT

## 2022-07-19 PROCEDURE — 36415 COLL VENOUS BLD VENIPUNCTURE: CPT

## 2022-12-06 ENCOUNTER — HOSPITAL ENCOUNTER (OUTPATIENT)
Dept: HOSPITAL 47 - CATHEP | Age: 72
LOS: 1 days | Discharge: HOME | End: 2022-12-07
Attending: INTERNAL MEDICINE
Payer: MEDICARE

## 2022-12-06 DIAGNOSIS — Z79.01: ICD-10-CM

## 2022-12-06 DIAGNOSIS — Z88.0: ICD-10-CM

## 2022-12-06 DIAGNOSIS — Z79.1: ICD-10-CM

## 2022-12-06 DIAGNOSIS — Z79.810: ICD-10-CM

## 2022-12-06 DIAGNOSIS — Z79.02: ICD-10-CM

## 2022-12-06 DIAGNOSIS — I45.10: ICD-10-CM

## 2022-12-06 DIAGNOSIS — F17.210: ICD-10-CM

## 2022-12-06 DIAGNOSIS — I10: ICD-10-CM

## 2022-12-06 DIAGNOSIS — E11.9: ICD-10-CM

## 2022-12-06 DIAGNOSIS — I42.2: Primary | ICD-10-CM

## 2022-12-06 DIAGNOSIS — Z79.899: ICD-10-CM

## 2022-12-06 DIAGNOSIS — I25.3: ICD-10-CM

## 2022-12-06 DIAGNOSIS — Z88.8: ICD-10-CM

## 2022-12-06 PROCEDURE — 80048 BASIC METABOLIC PNL TOTAL CA: CPT

## 2022-12-06 PROCEDURE — 33249 INSJ/RPLCMT DEFIB W/LEAD(S): CPT

## 2022-12-06 PROCEDURE — 85025 COMPLETE CBC W/AUTO DIFF WBC: CPT

## 2022-12-06 PROCEDURE — 71045 X-RAY EXAM CHEST 1 VIEW: CPT

## 2022-12-06 RX ADMIN — CEFAZOLIN SCH: 330 INJECTION, POWDER, FOR SOLUTION INTRAMUSCULAR; INTRAVENOUS at 15:35

## 2022-12-06 RX ADMIN — ACETAMINOPHEN PRN MG: 325 TABLET, FILM COATED ORAL at 21:11

## 2022-12-06 RX ADMIN — SODIUM CHLORIDE, PRESERVATIVE FREE SCH ML: 5 INJECTION INTRAVENOUS at 21:58

## 2022-12-06 RX ADMIN — METOPROLOL SUCCINATE SCH MG: 50 TABLET, EXTENDED RELEASE ORAL at 21:57

## 2022-12-06 RX ADMIN — GABAPENTIN SCH MG: 300 CAPSULE ORAL at 17:25

## 2022-12-06 RX ADMIN — GABAPENTIN SCH MG: 300 CAPSULE ORAL at 21:11

## 2022-12-06 RX ADMIN — SACUBITRIL AND VALSARTAN SCH EACH: 24; 26 TABLET, FILM COATED ORAL at 21:11

## 2022-12-06 RX ADMIN — POTASSIUM CHLORIDE SCH: 14.9 INJECTION, SOLUTION INTRAVENOUS at 15:35

## 2022-12-06 RX ADMIN — DEXTROSE SCH MLS/HR: 50 INJECTION, SOLUTION INTRAVENOUS at 21:57

## 2022-12-06 NOTE — P.PRLE
RE: Omid Luevano





Dear Dr. Kurt Petersen underwent dual-chamber ICD implantation for hypertrophic cardio 

myopathy/risk of sudden cardiac death given his high risk features


He tolerated the procedure well without any acute complications


I will now maximize his beta blockers further





Thank you for entrusting me with the care of the patient


Warm regards


Sincerely





Richy Baker

## 2022-12-06 NOTE — P.EPPROC
- EP Procedure Note


Electrophysiology Procedure Note: 





Diagnosis


Cardiomyopathy, hypertrophic with high risk features


IVCD right bundle branch block pattern, sinus bradycardia





Procedure: Dual-chamber ICD implantation for  management of risk of sudden 

cardiac death /bradycardia





Result: 


Dual chamber ICD implantation, 


Atrial lead: Medtronic 52 cm screw-in lead


P waves 2.9 mV, pacing impedance 590 ohms and pacing threshold 1 V at 0.4 ms





RV ICD lead: Medtronic 62 cm screw-in lead, single coil


R waves 8 mV pacing impedance 550 ohms, high-voltage impedance 69 ohms and 

pacing threshold 0.7 V at 0.4 ms





Procedure details: Patient was brought to the EP lab in a fasting state.  

Written informed consent was obtained prior to the procedure.  Options, pros and

cons, benefits and risks and complications discussed with patient in detail 

prior to the procedure (shared decision making).  Importance of continuing 

medical treatment emphasized.  Alternatives discussed.  Patient would like to 

proceed with dual-chamber ICD implant.





Left upper extremity venogram performed.  15 mL IV dye injected in the left arm.

 Patent axillary/subclavian vein





The left pectoral area was prepped and draped as a protocol.  IV antibiotics 

administered  1% lidocaine was used for local anesthesia.  A 4 cm incision was 

made parallel to the deltopectoral groove, about 1.5 cm medial to it.  The 

incision was carried down to the level of the pectoralis muscle and the 

subfascial pocket was made.  Hemostasis was assured.  The axillary vein access 

was obtained.  Appropriately sized into to see sheaths were placed.  





ICD lead implanted in the right ventricle and screwed in.  ICD lead tested for 

threshold, sensing, impedances and tested with high output pacing for 

diaphragmatic stimulation 


Atrial lead placed in the right atrial appendage and tested for threshold, 

sensing, impedance, and tested with high output pacing.  Phrenic nerve 

stimulation





Lead secured to the underlying transverse muscle after removing sheaths .  

Pocket irrigated with antibiotic solution 


Leads connected to the biventricular ICD generator.  Wound closed in 3 layers 

and dressed per protocol


Dual ICD interrogated and programmed.  Appropriate pacing parameters, 

antitachycardia therapies with antitachycardia pacing cardioversion 

defibrillations programmed.


Patient tolerated the procedure well without any acute complications.  





DFT deferred


Will be performed in about 3 months after further maximization of beta blockers

## 2022-12-07 VITALS
HEART RATE: 61 BPM | DIASTOLIC BLOOD PRESSURE: 80 MMHG | TEMPERATURE: 97.6 F | RESPIRATION RATE: 16 BRPM | SYSTOLIC BLOOD PRESSURE: 145 MMHG

## 2022-12-07 LAB
ANION GAP SERPL CALC-SCNC: 7 MMOL/L
BASOPHILS # BLD AUTO: 0.1 K/UL (ref 0–0.2)
BASOPHILS NFR BLD AUTO: 1 %
BUN SERPL-SCNC: 19 MG/DL (ref 9–20)
CALCIUM SPEC-MCNC: 8.7 MG/DL (ref 8.4–10.2)
CHLORIDE SERPL-SCNC: 107 MMOL/L (ref 98–107)
CO2 SERPL-SCNC: 25 MMOL/L (ref 22–30)
EOSINOPHIL # BLD AUTO: 0.4 K/UL (ref 0–0.7)
EOSINOPHIL NFR BLD AUTO: 5 %
ERYTHROCYTE [DISTWIDTH] IN BLOOD BY AUTOMATED COUNT: 4.89 M/UL (ref 4.3–5.9)
ERYTHROCYTE [DISTWIDTH] IN BLOOD: 13 % (ref 11.5–15.5)
GLUCOSE SERPL-MCNC: 138 MG/DL (ref 74–99)
HCT VFR BLD AUTO: 44.3 % (ref 39–53)
HGB BLD-MCNC: 15.3 GM/DL (ref 13–17.5)
LYMPHOCYTES # SPEC AUTO: 2.8 K/UL (ref 1–4.8)
LYMPHOCYTES NFR SPEC AUTO: 33 %
MCH RBC QN AUTO: 31.4 PG (ref 25–35)
MCHC RBC AUTO-ENTMCNC: 34.6 G/DL (ref 31–37)
MCV RBC AUTO: 90.6 FL (ref 80–100)
MONOCYTES # BLD AUTO: 0.7 K/UL (ref 0–1)
MONOCYTES NFR BLD AUTO: 8 %
NEUTROPHILS # BLD AUTO: 4.4 K/UL (ref 1.3–7.7)
NEUTROPHILS NFR BLD AUTO: 52 %
PLATELET # BLD AUTO: 165 K/UL (ref 150–450)
POTASSIUM SERPL-SCNC: 4.6 MMOL/L (ref 3.5–5.1)
SODIUM SERPL-SCNC: 139 MMOL/L (ref 137–145)
WBC # BLD AUTO: 8.6 K/UL (ref 3.8–10.6)

## 2022-12-07 RX ADMIN — METOPROLOL SUCCINATE SCH MG: 50 TABLET, EXTENDED RELEASE ORAL at 08:06

## 2022-12-07 RX ADMIN — CEFAZOLIN SCH MLS/HR: 330 INJECTION, POWDER, FOR SOLUTION INTRAMUSCULAR; INTRAVENOUS at 02:26

## 2022-12-07 RX ADMIN — SODIUM CHLORIDE, PRESERVATIVE FREE SCH ML: 5 INJECTION INTRAVENOUS at 08:04

## 2022-12-07 RX ADMIN — DEXTROSE SCH MLS/HR: 50 INJECTION, SOLUTION INTRAVENOUS at 07:57

## 2022-12-07 RX ADMIN — POTASSIUM CHLORIDE SCH: 14.9 INJECTION, SOLUTION INTRAVENOUS at 01:41

## 2022-12-07 RX ADMIN — DEXTROSE SCH MLS/HR: 50 INJECTION, SOLUTION INTRAVENOUS at 02:25

## 2022-12-07 RX ADMIN — GABAPENTIN SCH MG: 300 CAPSULE ORAL at 08:05

## 2022-12-07 RX ADMIN — SACUBITRIL AND VALSARTAN SCH EACH: 24; 26 TABLET, FILM COATED ORAL at 08:06

## 2022-12-07 RX ADMIN — ACETAMINOPHEN PRN MG: 325 TABLET, FILM COATED ORAL at 08:06

## 2022-12-07 NOTE — XR
EXAMINATION TYPE: XR chest 1V portable

 

DATE OF EXAM: 12/7/2022 6:51 AM

 

COMPARISON: Chest radiographs from 6 x 42

 

TECHNIQUE: XR chest 1V portable Portable AP radiograph of the chest.

 

CLINICAL INDICATION:Male, 71 years old with history of Lead placement check; 

 

FINDINGS: 

Lungs/Pleura: There is no evidence of pleural effusion, focal consolidation, or pneumothorax.  

Pulmonary vascularity: Unremarkable.

Heart/mediastinum: Cardiomediastinal silhouette is enlarged and stable. Two lead cardiac conduction d
evice overlying the left hemithorax with lead tips projecting over the right ventricle and right atri
um.

Musculoskeletal: No acute osseous pathology.

 

IMPRESSION: 

Cardiac conduction leads appear in appropriate position.

No acute cardiopulmonary disease/process.

## 2022-12-07 NOTE — P.DS
Providers


Attending physician: 


Richy Baker





Primary care physician: 


Se Hicks MD





Hospital Course: 





Patient is doing well.  Chest discomfort/incisional discomfort related to ICD 

implant


No dizziness no lightheadedness no shortness of breath


Minimal soakage of the dressing


I explained the precautions of the left arm to the wife


On examination, blood pressure 178/60 245/80


Afebrile


Pulse rate in the 60s normal respirations


Breath sounds are clear


Heart sounds are normal





Impression


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 

death including a wall thickness of 3.1 cm, large apical aneurysm, extensive 

delayed enhancement on cardiac MRI


Bradycardia


Status post dual-chamber ICD implant


Chest x-ray is normal


Antibiotics completed





Plan


Increase metoprolol to 150 mg by mouth daily in the afternoon, continue all 

other medications unchanged


Discharge home once the device is checked today and is within normal limits





Follow-up in the device clinic in one week


Follow-up with Dr. Baker in 6-8 weeks for further maximization of beta 

blockers for at least 200 mg by mouth daily if possible based on his blood 

pressure readings





Patient Condition at Discharge: Stable





Plan - Discharge Summary


Discharge Rx Participant: No


New Discharge Prescriptions: 


New


   RX: Metoprolol Succinate [Toprol XL] 50 mg PO DAILY #90 tab





Continue


   RX: Sertraline [Zoloft] 50 mg PO DAILY


   RX: Fenofibrate [Lofibra] 160 mg PO DAILY


   RX: Sacubitril/Valsartan [Entresto 24 mg-26 mg Tablet] 1 each PO BID 30 Days 

#60 tab


   RX: Furosemide [Lasix] 20 mg PO DAILY


   RX: Metoprolol Succinate (ER) [Toprol XL] 100 mg PO DAILY


   RX: Spironolactone [Aldactone] 12.5 mg PO DAILY


   RX: LORazepam [Ativan] 0.5 - 1 mg PO DAILY PRN


     PRN Reason: Anxiety


   RX: Gabapentin 300 mg PO TID


   RX: Rosuvastatin [Crestor] 10 mg PO DAILY 90 Days #90 tablet


Discharge Medication List





RX: Sertraline [Zoloft] 50 mg PO DAILY 08/10/14 [History]


RX: Fenofibrate [Lofibra] 160 mg PO DAILY 06/06/22 [History]


RX: Gabapentin 300 mg PO TID 06/06/22 [History]


RX: LORazepam [Ativan] 0.5 - 1 mg PO DAILY PRN 06/06/22 [History]


RX: Rosuvastatin [Crestor] 10 mg PO DAILY 90 Days #90 tablet 06/07/22 [Rx]


RX: Sacubitril/Valsartan [Entresto 24 mg-26 mg Tablet] 1 each PO BID 30 Days #60

tab 06/07/22 [Rx]


RX: Furosemide [Lasix] 20 mg PO DAILY 12/01/22 [History]


RX: Metoprolol Succinate (ER) [Toprol XL] 100 mg PO DAILY 12/01/22 [History]


RX: Spironolactone [Aldactone] 12.5 mg PO DAILY 12/01/22 [History]


RX: Metoprolol Succinate [Toprol XL] 50 mg PO DAILY #90 tab 12/06/22 [Rx]








Follow up Appointment(s)/Referral(s): 


Richy Baker MD [STAFF PHYSICIAN] - 1 Week


(Device clinic follow-up in 1 week


Follow-up Dr. Baker in 2 months)


Activity/Diet/Wound Care/Special Instructions: 


Post EP study - Ablation instructions





1.  Keep access sites dry for 2 days.


2.  No heavy lifting or straining for 2 days.


3.  Avoid bending the hips repeatedly for 2 days.


4.  You may go up and down stairs slowly  





Call if the following is noted





1.  Bleeding, increasing swelling or pain at the access sites.


2.  Increasing chest discomfort, especially upon taking a deep breath.


3.  Increasing shortness of breath, at rest or with exertion.


4.  Undue cough / phlegm


5.  Difficulty or pain while swallowing.


6.  Pain or change in color in the extremities.


7.  Fever, chills, rigors.


8.  Increasing headache or neurologic symptoms.


9.  Dizziness, fainting, palpitations





Increase metoprolol to 100 mg in the morning and 50 mg in the evening


Continue other medications as before


Discharge Disposition: HOME SELF-CARE

## 2023-03-29 ENCOUNTER — HOSPITAL ENCOUNTER (OUTPATIENT)
Dept: HOSPITAL 47 - CATHEP | Age: 73
End: 2023-03-29
Attending: INTERNAL MEDICINE
Payer: MEDICARE

## 2023-03-29 VITALS — BODY MASS INDEX: 35.6 KG/M2

## 2023-03-29 VITALS — TEMPERATURE: 99 F

## 2023-03-29 VITALS — HEART RATE: 60 BPM | SYSTOLIC BLOOD PRESSURE: 105 MMHG | DIASTOLIC BLOOD PRESSURE: 55 MMHG

## 2023-03-29 VITALS — RESPIRATION RATE: 16 BRPM

## 2023-03-29 DIAGNOSIS — E11.9: ICD-10-CM

## 2023-03-29 DIAGNOSIS — Z79.01: ICD-10-CM

## 2023-03-29 DIAGNOSIS — F41.0: ICD-10-CM

## 2023-03-29 DIAGNOSIS — I10: ICD-10-CM

## 2023-03-29 DIAGNOSIS — Z79.899: ICD-10-CM

## 2023-03-29 DIAGNOSIS — I42.9: Primary | ICD-10-CM

## 2023-03-29 LAB
ANION GAP SERPL CALC-SCNC: 10 MMOL/L
BUN SERPL-SCNC: 19 MG/DL (ref 9–20)
CALCIUM SPEC-MCNC: 9.7 MG/DL (ref 8.4–10.2)
CHLORIDE SERPL-SCNC: 106 MMOL/L (ref 98–107)
CO2 SERPL-SCNC: 26 MMOL/L (ref 22–30)
GLUCOSE BLD-MCNC: 108 MG/DL (ref 70–110)
GLUCOSE SERPL-MCNC: 111 MG/DL (ref 74–99)
POTASSIUM SERPL-SCNC: 4.2 MMOL/L (ref 3.5–5.1)
SODIUM SERPL-SCNC: 142 MMOL/L (ref 137–145)

## 2023-03-29 PROCEDURE — 80048 BASIC METABOLIC PNL TOTAL CA: CPT

## 2023-03-29 PROCEDURE — 93642 EP EVL 1/2CHMB TRNSVNS CVDFB: CPT

## 2023-03-30 NOTE — P.EPPROC
- EP Procedure Note


Electrophysiology Procedure Note: 





Diagnosis


Hypertrophic cardio myopathy


Dual-chamber ICD implant


Patient brought in for defibrillation level testing


Patient on guideline written medical treatment including beta blockers and 

ENTRESTO





[employer]onic dual chamber ICD, cobalt XT  interrogated


Longevity 12.8 years


Atrial pacing impedance 437 ohms, pacing threshold in the atrium 0.5 V at 0.4 

ms, impedance 437 ohms, P waves 1.4 mV


RV pacing impedance 361 ohms, high-voltage impedance 66 ohms


Threshold 0.5 V at 0.4 ms and R waves 9 mV





Defibrillation level testing performed


VF induced


Successfully detected without any dropouts and defibrillated with 10 J shock no 

post shock noise


Delivered energy 9.6 J, charge time 1.82 seconds, shocking impedance 69 ohms





New device was then re-programmed.  Appropriate antitachycardia pacing 

cardioversion and defibrillation line first cardioversion at 10 J


First defibrillation 40 J


Pacing mode AAI-DDD, lower rate 50 beats a minute, mode switch 171 beats a 

minute





Plan


Limited 2-D echo after 2 months.  If there is no aneurysm then we will stop 

ELIQUIS